# Patient Record
Sex: FEMALE | Race: WHITE
[De-identification: names, ages, dates, MRNs, and addresses within clinical notes are randomized per-mention and may not be internally consistent; named-entity substitution may affect disease eponyms.]

---

## 2020-08-02 ENCOUNTER — FORM ENCOUNTER (OUTPATIENT)
Age: 45
End: 2020-08-02

## 2020-08-18 ENCOUNTER — FORM ENCOUNTER (OUTPATIENT)
Age: 45
End: 2020-08-18

## 2020-09-15 ENCOUNTER — FORM ENCOUNTER (OUTPATIENT)
Age: 45
End: 2020-09-15

## 2021-08-25 ENCOUNTER — NON-APPOINTMENT (OUTPATIENT)
Age: 46
End: 2021-08-25

## 2021-08-25 ENCOUNTER — APPOINTMENT (OUTPATIENT)
Dept: OBGYN | Facility: CLINIC | Age: 46
End: 2021-08-25
Payer: COMMERCIAL

## 2021-08-25 VITALS
HEIGHT: 60 IN | BODY MASS INDEX: 23.56 KG/M2 | HEART RATE: 72 BPM | WEIGHT: 120 LBS | SYSTOLIC BLOOD PRESSURE: 105 MMHG | DIASTOLIC BLOOD PRESSURE: 70 MMHG

## 2021-08-25 DIAGNOSIS — N88.8 OTHER SPECIFIED NONINFLAMMATORY DISORDERS OF CERVIX UTERI: ICD-10-CM

## 2021-08-25 DIAGNOSIS — N95.1 MENOPAUSAL AND FEMALE CLIMACTERIC STATES: ICD-10-CM

## 2021-08-25 DIAGNOSIS — Z01.419 ENCOUNTER FOR GYNECOLOGICAL EXAMINATION (GENERAL) (ROUTINE) W/OUT ABNORMAL FINDINGS: ICD-10-CM

## 2021-08-25 DIAGNOSIS — N83.292 OTHER OVARIAN CYST, LEFT SIDE: ICD-10-CM

## 2021-08-25 PROBLEM — Z00.00 ENCOUNTER FOR PREVENTIVE HEALTH EXAMINATION: Status: ACTIVE | Noted: 2021-08-25

## 2021-08-25 PROCEDURE — 76830 TRANSVAGINAL US NON-OB: CPT

## 2021-08-25 PROCEDURE — 99396 PREV VISIT EST AGE 40-64: CPT | Mod: 25

## 2021-08-25 NOTE — HISTORY OF PRESENT ILLNESS
[Hot Flashes] : hot flashes [FreeTextEntry1] : routine visit\par menopausal symptoms\par \par last negrito visit\par \par \par    	Print\par Patient\par Name	DASIA NEWTON (44yo, F) ID# [id number]	Appt. 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 details\par Chief Complaint\par discuss test results\par Patient's Care Team\par Primary Care Provider: DENISSE WEBSTER: [address] [place] [address], Ph [phone], Fax [phone] NPI: [id number]\par Notes: DR FELIX\par Patient's Pharmacies\par [facility] [address] [place] [address] (ERX): [address] [place] [address], Ph [phone], Fax [phone]\par [id number] (ERX): [address] [place] [address], Ph [phone], Fax [phone]\par Allergies\par Allergies not reviewed (last reviewed 2020)\par LEVAQUIN	\par PENICILLINS	\par Medications\par Reviewed Medications\par acetaminophen 300 mg-codeine 30 mg tablet\par 10/05/16   filled	[facility]\par azithromycin 250 mg tablet\par 18   filled	[facility]\par azithromycin 500 mg tablet\par 18   filled	[facility]\par benzonatate 200 mg capsule\par 20   filled	[facility]\par clindamycin  mg capsule\par 16   filled	[facility]\par clindamycin  mg capsule\par 17   filled	[facility]\par diazePAM 5 mg tablet\par 10/06/15   filled	[facility]\par GaviLyte-G 236 gram-22.74 gram-6.74 gram-5.86 gram oral solution\par 17   filled	[facility]\par ibuprofen 600 mg tablet\par 16   filled	[facility]\par levocetirizine 5 mg tablet\par 17   filled	[facility]\par neomycin-polymyxin-hydrocort 3.5 mg/mL-10,000 unit/mL-1 % ear solution\par 20   filled	[facility]\par nitrofurantoin monohydrate/macrocrystals 100 mg capsule\par 17   filled	[facility]\par oxyCODONE-acetaminophen 5 mg-325 mg tablet\par 16   filled	[facility]\par Pataday 0.2 % eye drops\par 05/26/15   filled	[facility]\par predniSONE 20 mg tablet\par take 2 tablets by mouth once daily\par 20   filled	[facility]\par sulfamethoxazole 800 mg-trimethoprim 160 mg tablet\par 17   filled	[facility]\par Tamiflu 75 mg capsule\par 16   filled	[facility]\par tobramycin 0.3 % eye drops\par 16   filled	[facility]\par triamcinolone acetonide 0.5 % topical ointment\par 17   filled	[facility]\par Trulance 3 mg tablet\par 08/15/17   filled	[facility]\par Problems\par Reviewed Problems\par Dyspareunia\par Dysmenorrhea\par Irregular periods\par Gynecologic examination\par Family History\par Family History not reviewed (last reviewed 2020)\par Mother	- Chronic obstructive lung disease ( age: 68)\par Father	- Cerebrovascular accident (onset age: 62) ( age: 71)\par Maternal Aunt	- Leukemia (morphologic abnormality)\par Unspecified Relation	- Malignant tumor of unknown origin\par - mat cousin\par Maternal Aunt	- Leukemia (morphologic abnormality)\par Social History\par Social History not reviewed (last reviewed 2020)\par Tobacco Smoking Status: Former smoker (Notes: 20 y/ago)\par Most Recent Tobacco Use Screenin2020\par Surgical History\par Surgical History not reviewed (last reviewed 2018)\par LEEP - POST HYSTERECTOMY\par Hysterectomy - open...bilat salpingectomy\par Caesarean Section - x2\par Endometrial Ablation - X2\par Other - hernia inguinal\par Tubal Ligation\par GYN History\par GYN History not reviewed (last reviewed 2020)\par Duration of Flow (days): 6 (Notes: CRAMPING).\par LMP: Definite (Notes: partial hyst (2016)).\par Frequency of Cycle (Q days): (Notes: IRREG....SPOTTING).\par Menses Monthly: Y.\par Flow: Moderate.\par Date of LMP: 10/20/2017 (Notes: 2017 (Dark Blood)).\par .....CRAMPS\par Obstetric History\par Obstetric History not reviewed (last reviewed 2020)\par TOTAL	FULL	PRE	AB. I	AB. S	ECTOPICS	MULTIPLE	LIVING\par 2	2						2\par \par Past Medical History\par Past Medical History not reviewed (last reviewed 2017)\par Screening\par None recorded.\par Physical Exam\par Patient is a 45-year-old female.\par \par Assessment / Plan\par 1. Pelvic mass -\par telehealth, 20 minutes total, video and documentation\par \par pain is not life quality\par \par will observe and only operate if the pain worsens or if it grows.\par \par Discussed in detail.\par \par REPEAT SONO 6 MTHS\par \par R19.00: Intra-abdominal and pelvic swelling, mass and lump, unspecified site\par \par \par Return to Office\par None recorded.\par Encounter Sign-Off\par Encounter signed-off by Yvette Tristan MD, 2020.\par Encounter performed and documented by Yvette Tristan MD\par Encounter reviewed & signed by Yvette Tristan MD on 2020 at 9:34am\par Audit history\par \par \par Clinical Document #[id number]\par click here to view original file  \par \par previous MRI\par \par MR Pelvis WWO: 2020 2:01 PM\par INDICATION: Abnormal pelvic ultrasound.\par TECHNIQUE: Multiplanar multisequence MRI of the pelvis with and \par without contrast was performed. 12 mL of intravenous gadolinium \par (Dotarem) was administered without complication.\par COMPARISON: Pelvic ultrasound dated 2020.\par FINDINGS:\par UTERUS:\par Status post supracervical hysterectomy. In the remaining cervix, \par endocervical region, there is a complex collection, measuring 3.5 x \par 2.7 x 3.0 cm, appearing intrinsically T1 hyperintense with T2 shading \par on precontrast images without enhancement on postcontrast images, \par compatible with hemorrhagic and/or proteinaceous material.\par ADNEXA:\par Few bilateral ovarian cysts, measuring up to 1.8 cm on the right and \par 1.0 cm on the left. Susceptibility artifact in right adnexa.\par OTHER:\par Bladder: Normal in size and wall thickness.\par Bowel: No dilated pelvic bowel loops.\par Fluid: No significant pelvic ascites.\par Vessels: Patent and normal in caliber.\par Lymph nodes: No suspicious lymphadenopathy.\par Soft tissues: No significant hernia.\par Osseous: No suspicious osseous lesions.\par IMPRESSION:\par 1. Status post supracervical hysterectomy. In the remaining cervix, \par endocervical region, there is a complex collection, measuring 3.5 x \par 2.7 x 3.0 cm, composed of hemorrhagic and/or proteinaceous material. \par This is of indeterminate etiology, possibly secondary to stenosis or \par occult mass near external cervical os. Recommend tissue/fluid \par sampling.\par 2. Additional findings as noted above.\par Electronically signed by Chi Bunch MD 2020 2:08 PM \par Ref. Physician: YVETTE TRISTAN MD\par [address] [place] [address]  SUITE [facility]\[place] [address]\par

## 2021-08-25 NOTE — HISTORY OF PRESENT ILLNESS
[Hot Flashes] : hot flashes [FreeTextEntry1] : routine visit\par menopausal symptoms\par \par last negrito visit\par \par \par    	Print\par Patient\par Name	DASIA NEWTON (46yo, F) ID# [id number]	Appt. 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 details\par Chief Complaint\par discuss test results\par Patient's Care Team\par Primary Care Provider: DENISSE WEBSTER: [address] [place] [address], Ph [phone], Fax [phone] NPI: [id number]\par Notes: DR FELIX\par Patient's Pharmacies\par [facility] [address] [place] [address] (ERX): [address] [place] [address], Ph [phone], Fax [phone]\par [id number] (ERX): [address] [place] [address], Ph [phone], Fax [phone]\par Allergies\par Allergies not reviewed (last reviewed 2020)\par LEVAQUIN	\par PENICILLINS	\par Medications\par Reviewed Medications\par acetaminophen 300 mg-codeine 30 mg tablet\par 10/05/16   filled	[facility]\par azithromycin 250 mg tablet\par 18   filled	[facility]\par azithromycin 500 mg tablet\par 18   filled	[facility]\par benzonatate 200 mg capsule\par 20   filled	[facility]\par clindamycin  mg capsule\par 16   filled	[facility]\par clindamycin  mg capsule\par 17   filled	[facility]\par diazePAM 5 mg tablet\par 10/06/15   filled	[facility]\par GaviLyte-G 236 gram-22.74 gram-6.74 gram-5.86 gram oral solution\par 17   filled	[facility]\par ibuprofen 600 mg tablet\par 16   filled	[facility]\par levocetirizine 5 mg tablet\par 17   filled	[facility]\par neomycin-polymyxin-hydrocort 3.5 mg/mL-10,000 unit/mL-1 % ear solution\par 20   filled	[facility]\par nitrofurantoin monohydrate/macrocrystals 100 mg capsule\par 17   filled	[facility]\par oxyCODONE-acetaminophen 5 mg-325 mg tablet\par 16   filled	[facility]\par Pataday 0.2 % eye drops\par 05/26/15   filled	[facility]\par predniSONE 20 mg tablet\par take 2 tablets by mouth once daily\par 20   filled	[facility]\par sulfamethoxazole 800 mg-trimethoprim 160 mg tablet\par 17   filled	[facility]\par Tamiflu 75 mg capsule\par 16   filled	[facility]\par tobramycin 0.3 % eye drops\par 16   filled	[facility]\par triamcinolone acetonide 0.5 % topical ointment\par 17   filled	[facility]\par Trulance 3 mg tablet\par 08/15/17   filled	[facility]\par Problems\par Reviewed Problems\par Dyspareunia\par Dysmenorrhea\par Irregular periods\par Gynecologic examination\par Family History\par Family History not reviewed (last reviewed 2020)\par Mother	- Chronic obstructive lung disease ( age: 68)\par Father	- Cerebrovascular accident (onset age: 62) ( age: 71)\par Maternal Aunt	- Leukemia (morphologic abnormality)\par Unspecified Relation	- Malignant tumor of unknown origin\par - mat cousin\par Maternal Aunt	- Leukemia (morphologic abnormality)\par Social History\par Social History not reviewed (last reviewed 2020)\par Tobacco Smoking Status: Former smoker (Notes: 20 y/ago)\par Most Recent Tobacco Use Screenin2020\par Surgical History\par Surgical History not reviewed (last reviewed 2018)\par LEEP - POST HYSTERECTOMY\par Hysterectomy - open...bilat salpingectomy\par Caesarean Section - x2\par Endometrial Ablation - X2\par Other - hernia inguinal\par Tubal Ligation\par GYN History\par GYN History not reviewed (last reviewed 2020)\par Duration of Flow (days): 6 (Notes: CRAMPING).\par LMP: Definite (Notes: partial hyst (2016)).\par Frequency of Cycle (Q days): (Notes: IRREG....SPOTTING).\par Menses Monthly: Y.\par Flow: Moderate.\par Date of LMP: 10/20/2017 (Notes: 2017 (Dark Blood)).\par .....CRAMPS\par Obstetric History\par Obstetric History not reviewed (last reviewed 2020)\par TOTAL	FULL	PRE	AB. I	AB. S	ECTOPICS	MULTIPLE	LIVING\par 2	2						2\par \par Past Medical History\par Past Medical History not reviewed (last reviewed 2017)\par Screening\par None recorded.\par Physical Exam\par Patient is a 45-year-old female.\par \par Assessment / Plan\par 1. Pelvic mass -\par telehealth, 20 minutes total, video and documentation\par \par pain is not life quality\par \par will observe and only operate if the pain worsens or if it grows.\par \par Discussed in detail.\par \par REPEAT SONO 6 MTHS\par \par R19.00: Intra-abdominal and pelvic swelling, mass and lump, unspecified site\par \par \par Return to Office\par None recorded.\par Encounter Sign-Off\par Encounter signed-off by Yvette Tristan MD, 2020.\par Encounter performed and documented by Yvette Tristan MD\par Encounter reviewed & signed by Yvette Tristan MD on 2020 at 9:34am\par Audit history\par \par \par Clinical Document #[id number]\par click here to view original file  \par \par previous MRI\par \par MR Pelvis WWO: 2020 2:01 PM\par INDICATION: Abnormal pelvic ultrasound.\par TECHNIQUE: Multiplanar multisequence MRI of the pelvis with and \par without contrast was performed. 12 mL of intravenous gadolinium \par (Dotarem) was administered without complication.\par COMPARISON: Pelvic ultrasound dated 2020.\par FINDINGS:\par UTERUS:\par Status post supracervical hysterectomy. In the remaining cervix, \par endocervical region, there is a complex collection, measuring 3.5 x \par 2.7 x 3.0 cm, appearing intrinsically T1 hyperintense with T2 shading \par on precontrast images without enhancement on postcontrast images, \par compatible with hemorrhagic and/or proteinaceous material.\par ADNEXA:\par Few bilateral ovarian cysts, measuring up to 1.8 cm on the right and \par 1.0 cm on the left. Susceptibility artifact in right adnexa.\par OTHER:\par Bladder: Normal in size and wall thickness.\par Bowel: No dilated pelvic bowel loops.\par Fluid: No significant pelvic ascites.\par Vessels: Patent and normal in caliber.\par Lymph nodes: No suspicious lymphadenopathy.\par Soft tissues: No significant hernia.\par Osseous: No suspicious osseous lesions.\par IMPRESSION:\par 1. Status post supracervical hysterectomy. In the remaining cervix, \par endocervical region, there is a complex collection, measuring 3.5 x \par 2.7 x 3.0 cm, composed of hemorrhagic and/or proteinaceous material. \par This is of indeterminate etiology, possibly secondary to stenosis or \par occult mass near external cervical os. Recommend tissue/fluid \par sampling.\par 2. Additional findings as noted above.\par Electronically signed by Chi Bunch MD 2020 2:08 PM \par Ref. Physician: YVETTE TRISTAN MD\par [address] [place] [address]  SUITE [facility]\[place] [address]\par

## 2021-08-25 NOTE — PROCEDURE
[Suspected Ovarian Cyst] : suspected ovarian cyst [Absent] : absent [FreeTextEntry3] : cervical cyst smaller...asymptomatic..observe\par no free fluid\par small left ov cyst [FreeTextEntry5] : vcervix cyst: 9cc volume, max kelvin 2.8cm [FreeTextEntry7] : 1.4cc [FreeTextEntry8] : 30.4cc... 4cm cyst..small

## 2021-09-04 LAB
C TRACH RRNA SPEC QL NAA+PROBE: NOT DETECTED
HPV HIGH+LOW RISK DNA PNL CVX: NOT DETECTED
N GONORRHOEA RRNA SPEC QL NAA+PROBE: NOT DETECTED
SOURCE AMPLIFICATION: NORMAL

## 2021-09-11 LAB — CYTOLOGY CVX/VAG DOC THIN PREP: ABNORMAL

## 2021-09-18 LAB — ESTRADIOL SERPL-MCNC: 13 PG/ML

## 2021-09-19 LAB — FSH SERPL-MCNC: 32.9 IU/L

## 2021-09-22 ENCOUNTER — RESULT REVIEW (OUTPATIENT)
Age: 46
End: 2021-09-22

## 2021-09-22 ENCOUNTER — OUTPATIENT (OUTPATIENT)
Dept: OUTPATIENT SERVICES | Facility: HOSPITAL | Age: 46
LOS: 1 days | Discharge: HOME | End: 2021-09-22
Payer: COMMERCIAL

## 2021-09-22 DIAGNOSIS — Z12.31 ENCOUNTER FOR SCREENING MAMMOGRAM FOR MALIGNANT NEOPLASM OF BREAST: ICD-10-CM

## 2021-09-22 PROCEDURE — 77067 SCR MAMMO BI INCL CAD: CPT | Mod: 26

## 2021-09-22 PROCEDURE — 77063 BREAST TOMOSYNTHESIS BI: CPT | Mod: 26

## 2021-10-06 ENCOUNTER — TRANSCRIPTION ENCOUNTER (OUTPATIENT)
Age: 46
End: 2021-10-06

## 2021-10-08 DIAGNOSIS — R92.2 INCONCLUSIVE MAMMOGRAM: ICD-10-CM

## 2021-10-21 NOTE — PHYSICAL EXAM
Please call patient to discuss sx. Need UA/UC if concerns of UTI.  [Examination Of The Breasts] : a normal appearance [No Masses] : no breast masses were palpable [Labia Majora] : normal [Labia Minora] : normal [Normal] : normal [Absent] : absent [Uterine Adnexae] : normal

## 2022-10-02 ENCOUNTER — NON-APPOINTMENT (OUTPATIENT)
Age: 47
End: 2022-10-02

## 2023-02-19 ENCOUNTER — NON-APPOINTMENT (OUTPATIENT)
Age: 48
End: 2023-02-19

## 2023-03-01 ENCOUNTER — APPOINTMENT (OUTPATIENT)
Dept: OBGYN | Facility: CLINIC | Age: 48
End: 2023-03-01

## 2024-03-07 ENCOUNTER — OUTPATIENT (OUTPATIENT)
Dept: OUTPATIENT SERVICES | Facility: HOSPITAL | Age: 49
LOS: 1 days | End: 2024-03-07
Payer: COMMERCIAL

## 2024-03-07 DIAGNOSIS — Z00.00 ENCOUNTER FOR GENERAL ADULT MEDICAL EXAMINATION WITHOUT ABNORMAL FINDINGS: ICD-10-CM

## 2024-03-07 DIAGNOSIS — Z12.31 ENCOUNTER FOR SCREENING MAMMOGRAM FOR MALIGNANT NEOPLASM OF BREAST: ICD-10-CM

## 2024-03-07 PROCEDURE — 77063 BREAST TOMOSYNTHESIS BI: CPT | Mod: 26

## 2024-03-07 PROCEDURE — 77067 SCR MAMMO BI INCL CAD: CPT

## 2024-03-07 PROCEDURE — 77063 BREAST TOMOSYNTHESIS BI: CPT

## 2024-03-07 PROCEDURE — 77067 SCR MAMMO BI INCL CAD: CPT | Mod: 26

## 2024-03-08 DIAGNOSIS — Z12.31 ENCOUNTER FOR SCREENING MAMMOGRAM FOR MALIGNANT NEOPLASM OF BREAST: ICD-10-CM

## 2025-04-10 ENCOUNTER — OUTPATIENT (OUTPATIENT)
Dept: OUTPATIENT SERVICES | Facility: HOSPITAL | Age: 50
LOS: 1 days | End: 2025-04-10
Payer: COMMERCIAL

## 2025-04-10 DIAGNOSIS — Z12.31 ENCOUNTER FOR SCREENING MAMMOGRAM FOR MALIGNANT NEOPLASM OF BREAST: ICD-10-CM

## 2025-04-10 PROCEDURE — 77063 BREAST TOMOSYNTHESIS BI: CPT | Mod: 26

## 2025-04-10 PROCEDURE — 77063 BREAST TOMOSYNTHESIS BI: CPT

## 2025-04-10 PROCEDURE — 77067 SCR MAMMO BI INCL CAD: CPT

## 2025-04-10 PROCEDURE — 77067 SCR MAMMO BI INCL CAD: CPT | Mod: 26

## 2025-04-11 DIAGNOSIS — Z12.31 ENCOUNTER FOR SCREENING MAMMOGRAM FOR MALIGNANT NEOPLASM OF BREAST: ICD-10-CM

## 2025-05-16 ENCOUNTER — APPOINTMENT (OUTPATIENT)
Dept: ORTHOPEDIC SURGERY | Facility: CLINIC | Age: 50
End: 2025-05-16